# Patient Record
Sex: FEMALE
[De-identification: names, ages, dates, MRNs, and addresses within clinical notes are randomized per-mention and may not be internally consistent; named-entity substitution may affect disease eponyms.]

---

## 2021-05-10 ENCOUNTER — APPOINTMENT (OUTPATIENT)
Dept: INTERNAL MEDICINE | Facility: CLINIC | Age: 46
End: 2021-05-10
Payer: COMMERCIAL

## 2021-05-10 VITALS
DIASTOLIC BLOOD PRESSURE: 64 MMHG | RESPIRATION RATE: 14 BRPM | OXYGEN SATURATION: 100 % | TEMPERATURE: 97.8 F | SYSTOLIC BLOOD PRESSURE: 121 MMHG | WEIGHT: 177 LBS | HEIGHT: 69 IN | HEART RATE: 76 BPM | BODY MASS INDEX: 26.22 KG/M2

## 2021-05-10 DIAGNOSIS — Z84.0 FAMILY HISTORY OF DISEASES OF THE SKIN AND SUBCUTANEOUS TISSUE: ICD-10-CM

## 2021-05-10 DIAGNOSIS — Z83.3 FAMILY HISTORY OF DIABETES MELLITUS: ICD-10-CM

## 2021-05-10 DIAGNOSIS — E53.8 DEFICIENCY OF OTHER SPECIFIED B GROUP VITAMINS: ICD-10-CM

## 2021-05-10 DIAGNOSIS — R53.81 OTHER MALAISE: ICD-10-CM

## 2021-05-10 DIAGNOSIS — Z82.69 FAMILY HISTORY OF OTHER DISEASES OF THE MUSCULOSKELETAL SYSTEM AND CONNECTIVE TISSUE: ICD-10-CM

## 2021-05-10 DIAGNOSIS — Z86.39 PERSONAL HISTORY OF OTHER ENDOCRINE, NUTRITIONAL AND METABOLIC DISEASE: ICD-10-CM

## 2021-05-10 DIAGNOSIS — Z86.2 PERSONAL HISTORY OF DISEASES OF THE BLOOD AND BLOOD-FORMING ORGANS AND CERTAIN DISORDERS INVOLVING THE IMMUNE MECHANISM: ICD-10-CM

## 2021-05-10 DIAGNOSIS — Z78.9 OTHER SPECIFIED HEALTH STATUS: ICD-10-CM

## 2021-05-10 DIAGNOSIS — E06.3 AUTOIMMUNE THYROIDITIS: ICD-10-CM

## 2021-05-10 DIAGNOSIS — Z82.49 FAMILY HISTORY OF ISCHEMIC HEART DISEASE AND OTHER DISEASES OF THE CIRCULATORY SYSTEM: ICD-10-CM

## 2021-05-10 DIAGNOSIS — Z80.3 FAMILY HISTORY OF MALIGNANT NEOPLASM OF BREAST: ICD-10-CM

## 2021-05-10 DIAGNOSIS — D50.9 IRON DEFICIENCY ANEMIA, UNSPECIFIED: ICD-10-CM

## 2021-05-10 PROCEDURE — 99072 ADDL SUPL MATRL&STAF TM PHE: CPT

## 2021-05-10 PROCEDURE — 99203 OFFICE O/P NEW LOW 30 MIN: CPT | Mod: 25

## 2021-05-10 PROCEDURE — 36415 COLL VENOUS BLD VENIPUNCTURE: CPT

## 2021-05-10 NOTE — PAST MEDICAL HISTORY
[Menstruating] : hx of menstruating [Definite ___ (Date)] : the last menstrual period was [unfilled] [Excessive Bleeding] : there was excessive bleeding

## 2021-05-11 ENCOUNTER — TRANSCRIPTION ENCOUNTER (OUTPATIENT)
Age: 46
End: 2021-05-11

## 2021-05-11 PROBLEM — Z86.39 HISTORY OF HASHIMOTO THYROIDITIS: Status: RESOLVED | Noted: 2021-05-10 | Resolved: 2021-05-11

## 2021-05-11 PROBLEM — Z86.2 HISTORY OF IRON DEFICIENCY ANEMIA: Status: RESOLVED | Noted: 2021-05-10 | Resolved: 2021-05-11

## 2021-05-11 PROBLEM — Z80.3 FAMILY HISTORY OF MALIGNANT NEOPLASM OF BREAST: Status: ACTIVE | Noted: 2021-05-10

## 2021-05-11 PROBLEM — Z83.3 FAMILY HISTORY OF TYPE 1 DIABETES MELLITUS: Status: ACTIVE | Noted: 2021-05-10

## 2021-05-11 PROBLEM — Z78.9 NON-SMOKER: Status: ACTIVE | Noted: 2021-05-10

## 2021-05-11 PROBLEM — Z82.49 FAMILY HISTORY OF CORONARY ARTERY DISEASE: Status: ACTIVE | Noted: 2021-05-10

## 2021-05-11 PROBLEM — Z84.0 FAMILY HISTORY OF ALOPECIA: Status: ACTIVE | Noted: 2021-05-10

## 2021-05-11 PROBLEM — Z82.69 FAMILY HISTORY OF FIBROMYALGIA: Status: ACTIVE | Noted: 2021-05-10

## 2021-05-11 LAB — HETEROPH AB SER QL: NEGATIVE

## 2021-05-11 NOTE — END OF VISIT
[FreeTextEntry3] : I, Tatiana Campos, personally transcribed these services in the presence of Dr. Obinna Kelsey MD. I, Dr. Obinna Kelsey MD, personally performed the services described in this documentation as scribed by Tatiana Campos in my presence and it is both accurate and complete.\par

## 2021-05-11 NOTE — HEALTH RISK ASSESSMENT
[Yes] : Yes [2 - 4 times a month (2 pts)] : 2-4 times a month (2 points) [1 or 2 (0 pts)] : 1 or 2 (0 points) [Never (0 pts)] : Never (0 points) [No] : In the past 12 months have you used drugs other than those required for medical reasons? No [No falls in past year] : Patient reported no falls in the past year [0] : 2) Feeling down, depressed, or hopeless: Not at all (0) [] : No [de-identified] : occasionally [Audit-CScore] : 2 [de-identified] : Sedentary [de-identified] : Vegetarian [[id number]] : 0

## 2021-05-11 NOTE — HISTORY OF PRESENT ILLNESS
[FreeTextEntry8] : KATYA OSORIO is a 45 year old female with a PMHx of iron deficiency anemia and Hashimoto's thyroiditis who presents today for evaluation.\par \par She relates sweats, chills, fatigue and headache ten days ago. She is feeling okay now but today she relates back pain.\par \par Pt was seen at the urgent care center and received both rapid and PCR COVID-19 tests, both of which were negative. She also had blood tests done which showed borderline Vitamin B12, low iron and ferritin.\par \par Pt last PAP smear was 9/2020.\par \par Pt last mammography and breast US was 9/2020.\par \par Pt had thyroid US 9/2020.\par \par Pt has an allergy to hydroxy citronellal.

## 2021-05-11 NOTE — REVIEW OF SYSTEMS
[Fatigue] : fatigue [Back Pain] : back pain [Headache] : headache [Negative] : Heme/Lymph [FreeTextEntry2] : chills; sweats